# Patient Record
Sex: FEMALE | Race: ASIAN
[De-identification: names, ages, dates, MRNs, and addresses within clinical notes are randomized per-mention and may not be internally consistent; named-entity substitution may affect disease eponyms.]

---

## 2018-06-22 ENCOUNTER — HOSPITAL ENCOUNTER (EMERGENCY)
Dept: HOSPITAL 72 - EMR | Age: 73
Discharge: HOME | End: 2018-06-22
Payer: MEDICARE

## 2018-06-22 VITALS — WEIGHT: 134 LBS | BODY MASS INDEX: 24.66 KG/M2 | HEIGHT: 62 IN

## 2018-06-22 VITALS — DIASTOLIC BLOOD PRESSURE: 73 MMHG | SYSTOLIC BLOOD PRESSURE: 111 MMHG

## 2018-06-22 VITALS — DIASTOLIC BLOOD PRESSURE: 78 MMHG | SYSTOLIC BLOOD PRESSURE: 109 MMHG

## 2018-06-22 DIAGNOSIS — I10: ICD-10-CM

## 2018-06-22 DIAGNOSIS — Z96.652: ICD-10-CM

## 2018-06-22 DIAGNOSIS — R10.13: Primary | ICD-10-CM

## 2018-06-22 DIAGNOSIS — Z88.8: ICD-10-CM

## 2018-06-22 LAB
ADD MANUAL DIFF: NO
ALBUMIN SERPL-MCNC: 3.7 G/DL (ref 3.4–5)
ALBUMIN/GLOB SERPL: 0.9 {RATIO} (ref 1–2.7)
ALP SERPL-CCNC: 60 U/L (ref 46–116)
ALT SERPL-CCNC: 31 U/L (ref 12–78)
ANION GAP SERPL CALC-SCNC: 7 MMOL/L (ref 5–15)
APPEARANCE UR: CLEAR
APTT BLD: 25 SEC (ref 23–33)
APTT PPP: (no result) S
AST SERPL-CCNC: 21 U/L (ref 15–37)
BASOPHILS NFR BLD AUTO: 0.7 % (ref 0–2)
BILIRUB SERPL-MCNC: 0.7 MG/DL (ref 0.2–1)
BUN SERPL-MCNC: 10 MG/DL (ref 7–18)
CALCIUM SERPL-MCNC: 8.1 MG/DL (ref 8.5–10.1)
CHLORIDE SERPL-SCNC: 104 MMOL/L (ref 98–107)
CO2 SERPL-SCNC: 27 MMOL/L (ref 21–32)
CREAT SERPL-MCNC: 0.7 MG/DL (ref 0.55–1.3)
EOSINOPHIL NFR BLD AUTO: 3.2 % (ref 0–3)
ERYTHROCYTE [DISTWIDTH] IN BLOOD BY AUTOMATED COUNT: 10.9 % (ref 11.6–14.8)
GLOBULIN SER-MCNC: 4.1 G/DL
GLUCOSE UR STRIP-MCNC: NEGATIVE MG/DL
HCT VFR BLD CALC: 42.7 % (ref 37–47)
HGB BLD-MCNC: 14.5 G/DL (ref 12–16)
INR PPP: 1 (ref 0.9–1.1)
KETONES UR QL STRIP: NEGATIVE
LEUKOCYTE ESTERASE UR QL STRIP: NEGATIVE
LYMPHOCYTES NFR BLD AUTO: 25.8 % (ref 20–45)
MCV RBC AUTO: 93 FL (ref 80–99)
MONOCYTES NFR BLD AUTO: 12.2 % (ref 1–10)
NEUTROPHILS NFR BLD AUTO: 58.1 % (ref 45–75)
NITRITE UR QL STRIP: NEGATIVE
PH UR STRIP: 6.5 [PH] (ref 4.5–8)
PLATELET # BLD: 246 K/UL (ref 150–450)
POTASSIUM SERPL-SCNC: 3.8 MMOL/L (ref 3.5–5.1)
PROT UR QL STRIP: NEGATIVE
RBC # BLD AUTO: 4.6 M/UL (ref 4.2–5.4)
SODIUM SERPL-SCNC: 138 MMOL/L (ref 136–145)
SP GR UR STRIP: 1 (ref 1–1.03)
UROBILINOGEN UR-MCNC: NORMAL MG/DL (ref 0–1)
WBC # BLD AUTO: 5.6 K/UL (ref 4.8–10.8)

## 2018-06-22 PROCEDURE — 84484 ASSAY OF TROPONIN QUANT: CPT

## 2018-06-22 PROCEDURE — 36415 COLL VENOUS BLD VENIPUNCTURE: CPT

## 2018-06-22 PROCEDURE — 83036 HEMOGLOBIN GLYCOSYLATED A1C: CPT

## 2018-06-22 PROCEDURE — 80053 COMPREHEN METABOLIC PANEL: CPT

## 2018-06-22 PROCEDURE — 83690 ASSAY OF LIPASE: CPT

## 2018-06-22 PROCEDURE — 86900 BLOOD TYPING SEROLOGIC ABO: CPT

## 2018-06-22 PROCEDURE — 85730 THROMBOPLASTIN TIME PARTIAL: CPT

## 2018-06-22 PROCEDURE — 85025 COMPLETE CBC W/AUTO DIFF WBC: CPT

## 2018-06-22 PROCEDURE — 93005 ELECTROCARDIOGRAM TRACING: CPT

## 2018-06-22 PROCEDURE — 74177 CT ABD & PELVIS W/CONTRAST: CPT

## 2018-06-22 PROCEDURE — 86901 BLOOD TYPING SEROLOGIC RH(D): CPT

## 2018-06-22 PROCEDURE — 86850 RBC ANTIBODY SCREEN: CPT

## 2018-06-22 PROCEDURE — 96374 THER/PROPH/DIAG INJ IV PUSH: CPT

## 2018-06-22 PROCEDURE — 81003 URINALYSIS AUTO W/O SCOPE: CPT

## 2018-06-22 PROCEDURE — 99284 EMERGENCY DEPT VISIT MOD MDM: CPT

## 2018-06-22 PROCEDURE — 85610 PROTHROMBIN TIME: CPT

## 2018-06-22 NOTE — DIAGNOSTIC IMAGING REPORT
Clinical Indication: Abdominal pain, epigastric pain, heartburn

 

Technique:   No oral contrast utilized, per emergency room physician request.  IV

administration nonionic contrast. Venous phase spiral acquisition obtained through

the abdomen and pelvis. Multiplanar reconstructions were generated. Total dose length

product 596.7 mGycm. CTDIvol(s) 12.6 mGy. Dose reduction achieved using automated

exposure control

 

 

Comparison: none

 

Findings: The appendix is normal. No evidence of diverticulosis or diverticulitis. No

small bowel distention. Small fat-containing umbilical hernia incidentally noted. No

free or loculated intraperitoneal air or fluid is evident. Distal esophagus, stomach,

duodenum are unremarkable.

 

The liver is very mildly hypoattenuating diffusely. No focal abnormality. The

gallbladder, bile ducts, pancreas, spleen, adrenals, right kidney are unremarkable.

Left kidney demonstrates subcentimeter low-attenuation lesions which are too small to

characterize. The uterus demonstrates a 2 cm enhancing mass, presumably a fibroid. No

pelvic mass or adenopathy otherwise.

 

The bones demonstrate lumbar scoliotic deformity and mild degenerative spondylosis.

The included lung bases demonstrate some scarring in the medial lingula and right

middle lobe and some posterior dependent scarring.

 

Impression: No acute abnormality

 

Equivocal mild hepatic hypoattenuation, could indicate mild fatty change

 

Incidental findings of small uterine fibroid, lumbar scoliosis, mild degenerative

spondylosis, areas of pulmonary parenchymal scarring, small fat-containing umbilical

hernia 

 

Subcentimeter low-attenuation renal lesions, too small to characterize, most likely

benign simple cysts. No further follow-up necessary

 

This agrees with the preliminary interpretation provided on the PACS by Dr. Odonnell

 

The CT scanner at [facility] is accredited by the [employer]

[employer] and the scans are performed using protocols designed to limit radiation

exposure to as low as reasonably achievable to attain images of sufficient resolution

adequate for diagnostic evaluation.

## 2018-06-22 NOTE — EMERGENCY ROOM REPORT
History of Present Illness


General


Chief Complaint:  Abdominal Pain





Present Illness


HPI


Patient is a 72-year-old female who presented after increased epigastric pain.  

Patient reported having intermittent symptoms associated with some abdominal 

discomfort.  Patient gradual onset of symptoms.  She reports having prior 

history of neuropathy.  She denied prior history of diabetes.  Patient a 

previous left knee replacement.  Patient states that she had been not having 

any bloody stools.


Allergies:  


Coded Allergies:  


     BENAZEPRIL HCL (Verified  Allergy, Unknown, 3/5/13)





Patient History


Past Medical History:  see triage record


Reviewed Nursing Documentation:  PMH: Agreed; PSxH: Agreed





Nursing Documentation-PMH


Hx Cardiac Problems:  Yes - LEFT KNEE REPLACEMENT 2017


Hx Hypertension:  Yes


Hx Cancer:  No


Hx Gastrointestinal Problems:  Yes


Hx Neurological Problems:  No





Review of Systems


All Other Systems:  negative except mentioned in HPI





Physical Exam





Vital Signs








  Date Time  Temp Pulse Resp B/P (MAP) Pulse Ox O2 Delivery O2 Flow Rate FiO2


 


6/22/18 11:03 98.3 94 16 109/78 94 Room Air  





 98.2       








Sp02 EP Interpretation:  reviewed, normal


General Appearance:  normal inspection, well appearing, no apparent distress, 

alert, GCS 15


Head:  atraumatic


ENT:  normal ENT inspection, hearing grossly normal, normal voice


Neck:  normal inspection, full range of motion, supple, no bony tend


Respiratory:  normal inspection, lungs clear, normal breath sounds, no 

respiratory distress, no retraction, no wheezing


Cardiovascular #1:  regular rate, rhythm, no edema


Gastrointestinal:  normal inspection, normal bowel sounds, non tender, soft, no 

guarding, no hernia


Genitourinary:  no CVA tenderness


Musculoskeletal:  normal inspection, back normal, normal range of motion


Neurologic:  normal inspection, alert, oriented x3, responsive, CNs III-XII nml 

as tested, speech normal


Psychiatric:  normal inspection, judgement/insight normal, mood/affect normal


Skin:  normal inspection, normal color, no rash





Medical Decision Making


Diagnostic Impression:  


 Primary Impression:  


 Abdominal pain


ER Course


Patient presented for abdominal pain. Differential diagnoses included ischemic 

bowel, appendicitis, perforated viscus, abdominal aortic aneurysm, inferior 

myocardial infarction, viral gastroenteritis.  Because of complexity of patient'

s case laboratory testing and imaging studies were ordered.


The laboratory studies are unremarkable.  Patient was given prescription for 

oral acid blockers.The patient is advised to follow up with  primary care 

doctor in 1-2 days.  Patient is advised to return if any worsening condition or 

if any changes in status that are concerning.





This report is dictated with Dragon transcription software which may 

occasionally lead to discrepancies related to use of this software.





Labs








Test


  6/22/18


11:10 6/22/18


11:45


 


Urine Color Pale yellow  


 


Urine Appearance Clear  


 


Urine pH 6.5 (4.5-8.0)  


 


Urine Specific Gravity


  1.005


(1.005-1.035) 


 


 


Urine Protein


  Negative


(NEGATIVE) 


 


 


Urine Glucose (UA)


  Negative


(NEGATIVE) 


 


 


Urine Ketones


  Negative


(NEGATIVE) 


 


 


Urine Occult Blood 1+ (NEGATIVE)  


 


Urine Nitrite


  Negative


(NEGATIVE) 


 


 


Urine Bilirubin


  Negative


(NEGATIVE) 


 


 


Urine Urobilinogen


  Normal MG/DL


(0.0-1.0) 


 


 


Urine Leukocyte Esterase


  Negative


(NEGATIVE) 


 


 


Urine RBC


  0-2 /HPF (0 -


2) 


 


 


Urine WBC


  0-2 /HPF (0 -


2) 


 


 


Urine Squamous Epithelial


Cells Occasional


/LPF 


 


 


Urine Bacteria


  Few /HPF


(NONE) 


 


 


White Blood Count


  


  5.6 K/UL


(4.8-10.8)


 


Red Blood Count


  


  4.60 M/UL


(4.20-5.40)


 


Hemoglobin


  


  14.5 G/DL


(12.0-16.0)


 


Hematocrit


  


  42.7 %


(37.0-47.0)


 


Mean Corpuscular Volume  93 FL (80-99) 


 


Mean Corpuscular Hemoglobin


  


  31.4 PG


(27.0-31.0)


 


Mean Corpuscular Hemoglobin


Concent 


  33.8 G/DL


(32.0-36.0)


 


Red Cell Distribution Width


  


  10.9 %


(11.6-14.8)


 


Platelet Count


  


  246 K/UL


(150-450)


 


Mean Platelet Volume


  


  6.4 FL


(6.5-10.1)


 


Neutrophils (%) (Auto)


  


  58.1 %


(45.0-75.0)


 


Lymphocytes (%) (Auto)


  


  25.8 %


(20.0-45.0)


 


Monocytes (%) (Auto)


  


  12.2 %


(1.0-10.0)


 


Eosinophils (%) (Auto)


  


  3.2 %


(0.0-3.0)


 


Basophils (%) (Auto)


  


  0.7 %


(0.0-2.0)


 


Prothrombin Time


  


  10.1 SEC


(9.30-11.50)


 


Prothromb Time International


Ratio 


  1.0 (0.9-1.1) 


 


 


Activated Partial


Thromboplast Time 


  25 SEC (23-33) 


 


 


Sodium Level


  


  138 MMOL/L


(136-145)


 


Potassium Level


  


  3.8 MMOL/L


(3.5-5.1)


 


Chloride Level


  


  104 MMOL/L


()


 


Carbon Dioxide Level


  


  27 MMOL/L


(21-32)


 


Anion Gap


  


  7 mmol/L


(5-15)


 


Blood Urea Nitrogen


  


  10 mg/dL


(7-18)


 


Creatinine


  


  0.7 MG/DL


(0.55-1.30)


 


Estimat Glomerular Filtration


Rate 


   mL/min (>60) 


 


 


Glucose Level


  


  107 MG/DL


()


 


Hemoglobin A1c


  


  5.8 %


(4.3-6.0)


 


Calcium Level


  


  8.1 MG/DL


(8.5-10.1)


 


Total Bilirubin


  


  0.7 MG/DL


(0.2-1.0)


 


Aspartate Amino Transf


(AST/SGOT) 


  21 U/L (15-37) 


 


 


Alanine Aminotransferase


(ALT/SGPT) 


  31 U/L (12-78) 


 


 


Alkaline Phosphatase


  


  60 U/L


()


 


Troponin I


  


  0.000 ng/mL


(0.000-0.056)


 


Total Protein


  


  7.8 G/DL


(6.4-8.2)


 


Albumin


  


  3.7 G/DL


(3.4-5.0)


 


Globulin  4.1 g/dL 


 


Albumin/Globulin Ratio  0.9 (1.0-2.7) 


 


Lipase


  


  289 U/L


()











Last Vital Signs








  Date Time  Temp Pulse Resp B/P (MAP) Pulse Ox O2 Delivery O2 Flow Rate FiO2


 


6/22/18 14:09 98.2  16 111/73 94 Room Air  





 98.2       


 


6/22/18 11:03  94      








Status:  improved


Disposition:  HOME, SELF-CARE


Condition:  Stable


Scripts


Omeprazole (OMEPRAZOLE) 10 Mg Capsule.dr


10 MG ORAL DAILY, #30 CAP 0 Refills


   Prov: Cory Herndon MD         6/22/18 


Dicyclomine Hcl* (DICYCLOMINE HCL*) 10 Mg Capsule


10 MG PO QID, #20 CAP


   Prov: Cory Herndon MD         6/22/18


Patient Instructions:  Abdominal Pain, Adult











Cory Herndon MD Jun 22, 2018 14:45